# Patient Record
Sex: FEMALE | Race: WHITE | NOT HISPANIC OR LATINO | Employment: OTHER | URBAN - METROPOLITAN AREA
[De-identification: names, ages, dates, MRNs, and addresses within clinical notes are randomized per-mention and may not be internally consistent; named-entity substitution may affect disease eponyms.]

---

## 2024-11-05 ENCOUNTER — HOSPITAL ENCOUNTER (EMERGENCY)
Facility: HOSPITAL | Age: 89
Discharge: HOME | End: 2024-11-05
Payer: MEDICARE

## 2024-11-05 VITALS
DIASTOLIC BLOOD PRESSURE: 79 MMHG | BODY MASS INDEX: 34.73 KG/M2 | WEIGHT: 196 LBS | HEART RATE: 69 BPM | RESPIRATION RATE: 18 BRPM | SYSTOLIC BLOOD PRESSURE: 174 MMHG | HEIGHT: 63 IN | OXYGEN SATURATION: 96 % | TEMPERATURE: 97.7 F

## 2024-11-05 DIAGNOSIS — N39.0 URINARY TRACT INFECTION WITHOUT HEMATURIA, SITE UNSPECIFIED: ICD-10-CM

## 2024-11-05 DIAGNOSIS — H61.22 IMPACTED CERUMEN OF LEFT EAR: Primary | ICD-10-CM

## 2024-11-05 LAB
APPEARANCE UR: ABNORMAL
BACTERIA #/AREA URNS AUTO: ABNORMAL /HPF
BILIRUB UR STRIP.AUTO-MCNC: ABNORMAL MG/DL
COLOR UR: ABNORMAL
GLUCOSE UR STRIP.AUTO-MCNC: NORMAL MG/DL
HOLD SPECIMEN: NORMAL
KETONES UR STRIP.AUTO-MCNC: NEGATIVE MG/DL
LEUKOCYTE ESTERASE UR QL STRIP.AUTO: ABNORMAL
NITRITE UR QL STRIP.AUTO: ABNORMAL
PH UR STRIP.AUTO: 8 [PH]
PROT UR STRIP.AUTO-MCNC: ABNORMAL MG/DL
RBC # UR STRIP.AUTO: NEGATIVE /UL
RBC #/AREA URNS AUTO: ABNORMAL /HPF
SP GR UR STRIP.AUTO: 1.01
SQUAMOUS #/AREA URNS AUTO: ABNORMAL /HPF
UROBILINOGEN UR STRIP.AUTO-MCNC: ABNORMAL MG/DL
WBC #/AREA URNS AUTO: >50 /HPF

## 2024-11-05 PROCEDURE — 99283 EMERGENCY DEPT VISIT LOW MDM: CPT

## 2024-11-05 PROCEDURE — 87086 URINE CULTURE/COLONY COUNT: CPT | Mod: ELYLAB | Performed by: PHYSICIAN ASSISTANT

## 2024-11-05 PROCEDURE — 81001 URINALYSIS AUTO W/SCOPE: CPT | Performed by: PHYSICIAN ASSISTANT

## 2024-11-05 PROCEDURE — 2500000001 HC RX 250 WO HCPCS SELF ADMINISTERED DRUGS (ALT 637 FOR MEDICARE OP): Performed by: PHYSICIAN ASSISTANT

## 2024-11-05 PROCEDURE — 69209 REMOVE IMPACTED EAR WAX UNI: CPT | Mod: LT

## 2024-11-05 RX ORDER — CEPHALEXIN 500 MG/1
500 CAPSULE ORAL 2 TIMES DAILY
Qty: 14 CAPSULE | Refills: 0 | Status: SHIPPED | OUTPATIENT
Start: 2024-11-05 | End: 2024-11-12

## 2024-11-05 RX ORDER — CEPHALEXIN 500 MG/1
500 CAPSULE ORAL ONCE
Status: COMPLETED | OUTPATIENT
Start: 2024-11-05 | End: 2024-11-05

## 2024-11-05 ASSESSMENT — PAIN SCALES - GENERAL
PAINLEVEL_OUTOF10: 9
PAINLEVEL_OUTOF10: 0 - NO PAIN

## 2024-11-05 ASSESSMENT — COLUMBIA-SUICIDE SEVERITY RATING SCALE - C-SSRS
6. HAVE YOU EVER DONE ANYTHING, STARTED TO DO ANYTHING, OR PREPARED TO DO ANYTHING TO END YOUR LIFE?: NO
1. IN THE PAST MONTH, HAVE YOU WISHED YOU WERE DEAD OR WISHED YOU COULD GO TO SLEEP AND NOT WAKE UP?: NO
2. HAVE YOU ACTUALLY HAD ANY THOUGHTS OF KILLING YOURSELF?: NO

## 2024-11-05 ASSESSMENT — PAIN - FUNCTIONAL ASSESSMENT
PAIN_FUNCTIONAL_ASSESSMENT: 0-10
PAIN_FUNCTIONAL_ASSESSMENT: 0-10

## 2024-11-05 ASSESSMENT — LIFESTYLE VARIABLES
EVER HAD A DRINK FIRST THING IN THE MORNING TO STEADY YOUR NERVES TO GET RID OF A HANGOVER: NO
EVER FELT BAD OR GUILTY ABOUT YOUR DRINKING: NO
HAVE YOU EVER FELT YOU SHOULD CUT DOWN ON YOUR DRINKING: NO
TOTAL SCORE: 0
HAVE PEOPLE ANNOYED YOU BY CRITICIZING YOUR DRINKING: NO

## 2024-11-05 NOTE — ED PROVIDER NOTES
"HPI   Chief Complaint   Patient presents with    Cerumen Impaction     \"My ears is impacted with wax and I cannot get to the doctor until 12/2.\"    UTI symptoms for awhile       A 89-year-old female patient comes into the emergency department today with complaints of left-sided ear discomfort and decreased hearing.  She believes that she may have an impacted ear.  She also states she has some urinary incontinence is concerned about possible UTI.  For this purpose she comes into the emergency department today for further evaluation.  Otherwise no other complaints this present time.              Patient History   Past Medical History:   Diagnosis Date    Arcus senilis, unspecified eye 04/10/2018    Arcus senilis    Dry eye syndrome of unspecified lacrimal gland 04/10/2018    Dry eye syndrome    Personal history of other diseases of the musculoskeletal system and connective tissue     History of arthritis    Personal history of other endocrine, nutritional and metabolic disease     History of thyroid disease    Presence of intraocular lens 04/10/2018    Pseudophakia    Pure hypercholesterolemia, unspecified     High cholesterol     Past Surgical History:   Procedure Laterality Date    CATARACT EXTRACTION  02/23/2017    Cataract Extraction    OTHER SURGICAL HISTORY  02/23/2017    Iridotomy By YAG Laser     No family history on file.  Social History     Tobacco Use    Smoking status: Never    Smokeless tobacco: Never   Vaping Use    Vaping status: Never Used   Substance Use Topics    Alcohol use: Never    Drug use: Never       Physical Exam   ED Triage Vitals [11/05/24 1155]   Temperature Heart Rate Respirations BP   36.5 °C (97.7 °F) 67 18 (!) 180/106      Pulse Ox Temp Source Heart Rate Source Patient Position   96 % Temporal Monitor Sitting      BP Location FiO2 (%)     Right arm --       Physical Exam  Constitutional:       General: She is not in acute distress.     Appearance: Normal appearance. She is not " ill-appearing or diaphoretic.   HENT:      Head: Normocephalic and atraumatic.      Right Ear: Tympanic membrane normal.      Left Ear: There is impacted cerumen.      Nose: Nose normal.   Eyes:      Extraocular Movements: Extraocular movements intact.      Conjunctiva/sclera: Conjunctivae normal.      Pupils: Pupils are equal, round, and reactive to light.   Cardiovascular:      Rate and Rhythm: Normal rate and regular rhythm.   Pulmonary:      Effort: Pulmonary effort is normal. No respiratory distress.      Breath sounds: Normal breath sounds. No stridor. No wheezing.   Abdominal:      Tenderness: There is no right CVA tenderness or left CVA tenderness.   Musculoskeletal:         General: Normal range of motion.      Cervical back: Normal range of motion.   Skin:     General: Skin is warm and dry.   Neurological:      General: No focal deficit present.      Mental Status: She is alert and oriented to person, place, and time. Mental status is at baseline.   Psychiatric:         Mood and Affect: Mood normal.           ED Course & MDM   Diagnoses as of 11/05/24 1417   Impacted cerumen of left ear   Urinary tract infection without hematuria, site unspecified                 No data recorded                                 Medical Decision Making  A 89-year-old female patient comes into the emergency department today with complaints of left-sided ear discomfort and decreased hearing.  She believes that she may have an impacted ear.  She also states she has some urinary incontinence is concerned about possible UTI.  For this purpose she comes into the emergency department today for further evaluation.  Otherwise no other complaints this present time.    On clinical exam patient has a left-sided cerumen impaction.  Urinalysis ordered to rule out UTI.    Patient had successful ear irrigation no longer impacted cerumen on the left.  Will discharge patient home with p.o. Keflex for her UTI.  Patient agrees with this plan  expressed verbal understanding.  All questions were answered.    Historians patient    Diagnosis: Left cerumen impaction, urinary tract infection      Labs Reviewed   URINALYSIS WITH REFLEX CULTURE AND MICROSCOPIC - Abnormal       Result Value    Color, Urine Dark-Yellow      Appearance, Urine Turbid (*)     Specific Gravity, Urine 1.013      pH, Urine 8.0      Protein, Urine 10 (TRACE)      Glucose, Urine Normal      Blood, Urine NEGATIVE      Ketones, Urine NEGATIVE      Bilirubin, Urine 1 (1+) (*)     Urobilinogen, Urine 3 (1+) (*)     Nitrite, Urine 2+ (*)     Leukocyte Esterase, Urine 500 [place]/µL (*)    MICROSCOPIC ONLY, URINE - Abnormal    WBC, Urine >50 (*)     RBC, Urine NONE      Squamous Epithelial Cells, Urine 1-9 (SPARSE)      Bacteria, Urine 1+ (*)    URINE CULTURE   URINALYSIS WITH REFLEX CULTURE AND MICROSCOPIC    Narrative:     The following orders were created for panel order Urinalysis with Reflex Culture and Microscopic.  Procedure                               Abnormality         Status                     ---------                               -----------         ------                     Urinalysis with Reflex C...[[id number]]  Abnormal            Final result               Extra Urine Gray Tube[[id number]]                            In process                   Please view results for these tests on the individual orders.   EXTRA URINE GRAY TUBE        No orders to display         Procedure  Procedures     Dionisio Viera PA-C  11/05/24 3358

## 2024-11-06 LAB — BACTERIA UR CULT: ABNORMAL

## 2024-11-07 ENCOUNTER — TELEPHONE (OUTPATIENT)
Dept: PHARMACY | Facility: HOSPITAL | Age: 89
End: 2024-11-07
Payer: MEDICARE

## 2024-11-07 NOTE — TELEPHONE ENCOUNTER
EDPD Note: Rapid Result Review    I reviewed Ursula Barajas 's chart regarding a positive urine culture/result that was taken during their recent emergency room visit. The patient was not told about these results prior to leaving the emergency department. Therefore, patient was contacted and given proper education.     No results found for the last 90 days.    Patient is a 89yo female who presented to ED with urinary incontinence, discharged with Keflex 500mg 1 capsule PO BID for 7 days.     Patient was called and is still incontinent, confirmed no pain while urinating or flank pain. Patient took OTC medication for UTI, phenazopyridine, which turned urine red, patient was concerned about bladder cancer.     Patient cultures were present due to method of urine collection, needs urine collection hat and is unable to repeat due to transportation issues. Recommended continuation of antibiotics and counseled on possible side effects of nausea and diarrhea, take with food to reduce adverse side effects.        No further follow up needed from EDPD Team.     If there are any other questions for the ED Post-Discharge Culture Follow Up Team, please contact [phone]. Fax: [phone].    Vanesa Person, PharmD

## 2024-11-07 NOTE — PROGRESS NOTES
EDPD Note: Rapid Result Review    I reviewed Ursula Barajas 's chart regarding a positive urine culture/result that was taken during their recent emergency room visit. The patient was not told about these results prior to leaving the emergency department. Therefore, patient was contacted and given proper education.     No results found for the last 90 days.    Patient is a 89yo female who presented to ED with urinary incontinence, discharged with Keflex 500mg 1 capsule PO BID for 7 days.     Patient was called and is still incontinent, confirmed no pain while urinating or flank pain. Patient took OTC medication for UTI, phenazopyridine, which turned urine red, patient was concerned about bladder cancer.     Patient cultures were present due to method of urine collection, needs urine collection hat and is unable to repeat due to transportation issues. Recommended continuation of antibiotics and counseled on possible side effects of nausea and diarrhea, take with food to reduce adverse side effects.        No further follow up needed from EDPD Team.     If there are any other questions for the ED Post-Discharge Culture Follow Up Team, please contact [phone]. Fax: [phone].    Vanesa Person, PharmD

## 2024-11-13 ENCOUNTER — APPOINTMENT (OUTPATIENT)
Dept: CARDIOLOGY | Facility: HOSPITAL | Age: 89
End: 2024-11-13
Payer: MEDICARE

## 2024-11-13 ENCOUNTER — HOSPITAL ENCOUNTER (EMERGENCY)
Facility: HOSPITAL | Age: 89
Discharge: HOME | End: 2024-11-13
Payer: MEDICARE

## 2024-11-13 ENCOUNTER — APPOINTMENT (OUTPATIENT)
Dept: RADIOLOGY | Facility: HOSPITAL | Age: 89
End: 2024-11-13
Payer: MEDICARE

## 2024-11-13 VITALS
DIASTOLIC BLOOD PRESSURE: 73 MMHG | SYSTOLIC BLOOD PRESSURE: 156 MMHG | BODY MASS INDEX: 34.55 KG/M2 | TEMPERATURE: 98.2 F | RESPIRATION RATE: 18 BRPM | WEIGHT: 195 LBS | HEIGHT: 63 IN | HEART RATE: 58 BPM | OXYGEN SATURATION: 98 %

## 2024-11-13 DIAGNOSIS — R51.9 NONINTRACTABLE HEADACHE, UNSPECIFIED CHRONICITY PATTERN, UNSPECIFIED HEADACHE TYPE: Primary | ICD-10-CM

## 2024-11-13 LAB
ALBUMIN SERPL BCP-MCNC: 4.2 G/DL (ref 3.4–5)
ALP SERPL-CCNC: 52 U/L (ref 33–136)
ALT SERPL W P-5'-P-CCNC: 12 U/L (ref 7–45)
ANION GAP SERPL CALC-SCNC: 15 MMOL/L (ref 10–20)
APPEARANCE UR: CLEAR
AST SERPL W P-5'-P-CCNC: 15 U/L (ref 9–39)
BASOPHILS # BLD AUTO: 0.05 X10*3/UL (ref 0–0.1)
BASOPHILS NFR BLD AUTO: 0.8 %
BILIRUB SERPL-MCNC: 0.6 MG/DL (ref 0–1.2)
BILIRUB UR STRIP.AUTO-MCNC: NEGATIVE MG/DL
BNP SERPL-MCNC: 48 PG/ML (ref 0–99)
BUN SERPL-MCNC: 23 MG/DL (ref 6–23)
CALCIUM SERPL-MCNC: 9.2 MG/DL (ref 8.6–10.3)
CARDIAC TROPONIN I PNL SERPL HS: 10 NG/L (ref 0–13)
CARDIAC TROPONIN I PNL SERPL HS: 9 NG/L (ref 0–13)
CHLORIDE SERPL-SCNC: 101 MMOL/L (ref 98–107)
CO2 SERPL-SCNC: 28 MMOL/L (ref 21–32)
COLOR UR: ABNORMAL
CREAT SERPL-MCNC: 1.29 MG/DL (ref 0.5–1.05)
EGFRCR SERPLBLD CKD-EPI 2021: 40 ML/MIN/1.73M*2
EOSINOPHIL # BLD AUTO: 0.09 X10*3/UL (ref 0–0.4)
EOSINOPHIL NFR BLD AUTO: 1.4 %
ERYTHROCYTE [DISTWIDTH] IN BLOOD BY AUTOMATED COUNT: 13.2 % (ref 11.5–14.5)
ERYTHROCYTE [SEDIMENTATION RATE] IN BLOOD BY WESTERGREN METHOD: 35 MM/H (ref 0–30)
GLUCOSE SERPL-MCNC: 129 MG/DL (ref 74–99)
GLUCOSE UR STRIP.AUTO-MCNC: NORMAL MG/DL
HCT VFR BLD AUTO: 38.4 % (ref 36–46)
HGB BLD-MCNC: 12.6 G/DL (ref 12–16)
HOLD SPECIMEN: NORMAL
IMM GRANULOCYTES # BLD AUTO: 0.02 X10*3/UL (ref 0–0.5)
IMM GRANULOCYTES NFR BLD AUTO: 0.3 % (ref 0–0.9)
INR PPP: 1.1 (ref 0.9–1.1)
KETONES UR STRIP.AUTO-MCNC: NEGATIVE MG/DL
LACTATE SERPL-SCNC: 1.8 MMOL/L (ref 0.4–2)
LEUKOCYTE ESTERASE UR QL STRIP.AUTO: ABNORMAL
LYMPHOCYTES # BLD AUTO: 2.01 X10*3/UL (ref 0.8–3)
LYMPHOCYTES NFR BLD AUTO: 30.9 %
MAGNESIUM SERPL-MCNC: 1.87 MG/DL (ref 1.6–2.4)
MCH RBC QN AUTO: 28.5 PG (ref 26–34)
MCHC RBC AUTO-ENTMCNC: 32.8 G/DL (ref 32–36)
MCV RBC AUTO: 87 FL (ref 80–100)
MONOCYTES # BLD AUTO: 0.5 X10*3/UL (ref 0.05–0.8)
MONOCYTES NFR BLD AUTO: 7.7 %
MUCOUS THREADS #/AREA URNS AUTO: ABNORMAL /LPF
NEUTROPHILS # BLD AUTO: 3.83 X10*3/UL (ref 1.6–5.5)
NEUTROPHILS NFR BLD AUTO: 58.9 %
NITRITE UR QL STRIP.AUTO: NEGATIVE
NRBC BLD-RTO: 0 /100 WBCS (ref 0–0)
PH UR STRIP.AUTO: 5.5 [PH]
PLATELET # BLD AUTO: 308 X10*3/UL (ref 150–450)
POTASSIUM SERPL-SCNC: 3.8 MMOL/L (ref 3.5–5.3)
PROT SERPL-MCNC: 7 G/DL (ref 6.4–8.2)
PROT UR STRIP.AUTO-MCNC: NEGATIVE MG/DL
PROTHROMBIN TIME: 12.6 SECONDS (ref 9.8–12.8)
RBC # BLD AUTO: 4.42 X10*6/UL (ref 4–5.2)
RBC # UR STRIP.AUTO: NEGATIVE /UL
RBC #/AREA URNS AUTO: ABNORMAL /HPF
SODIUM SERPL-SCNC: 140 MMOL/L (ref 136–145)
SP GR UR STRIP.AUTO: 1.03
UROBILINOGEN UR STRIP.AUTO-MCNC: NORMAL MG/DL
WBC # BLD AUTO: 6.5 X10*3/UL (ref 4.4–11.3)
WBC #/AREA URNS AUTO: >50 /HPF
WBC CLUMPS #/AREA URNS AUTO: ABNORMAL /HPF

## 2024-11-13 PROCEDURE — 70498 CT ANGIOGRAPHY NECK: CPT | Performed by: RADIOLOGY

## 2024-11-13 PROCEDURE — 2550000001 HC RX 255 CONTRASTS: Performed by: PHYSICIAN ASSISTANT

## 2024-11-13 PROCEDURE — 36415 COLL VENOUS BLD VENIPUNCTURE: CPT | Performed by: PHYSICIAN ASSISTANT

## 2024-11-13 PROCEDURE — 99285 EMERGENCY DEPT VISIT HI MDM: CPT | Mod: 25

## 2024-11-13 PROCEDURE — 70450 CT HEAD/BRAIN W/O DYE: CPT

## 2024-11-13 PROCEDURE — 96365 THER/PROPH/DIAG IV INF INIT: CPT | Mod: 59

## 2024-11-13 PROCEDURE — 71045 X-RAY EXAM CHEST 1 VIEW: CPT

## 2024-11-13 PROCEDURE — 2500000004 HC RX 250 GENERAL PHARMACY W/ HCPCS (ALT 636 FOR OP/ED): Performed by: PHYSICIAN ASSISTANT

## 2024-11-13 PROCEDURE — 85025 COMPLETE CBC W/AUTO DIFF WBC: CPT | Performed by: PHYSICIAN ASSISTANT

## 2024-11-13 PROCEDURE — 80053 COMPREHEN METABOLIC PANEL: CPT | Performed by: PHYSICIAN ASSISTANT

## 2024-11-13 PROCEDURE — 93005 ELECTROCARDIOGRAM TRACING: CPT

## 2024-11-13 PROCEDURE — 85610 PROTHROMBIN TIME: CPT | Performed by: PHYSICIAN ASSISTANT

## 2024-11-13 PROCEDURE — 81001 URINALYSIS AUTO W/SCOPE: CPT | Performed by: PHYSICIAN ASSISTANT

## 2024-11-13 PROCEDURE — 96375 TX/PRO/DX INJ NEW DRUG ADDON: CPT | Mod: 59

## 2024-11-13 PROCEDURE — 85652 RBC SED RATE AUTOMATED: CPT | Performed by: PHYSICIAN ASSISTANT

## 2024-11-13 PROCEDURE — 70450 CT HEAD/BRAIN W/O DYE: CPT | Performed by: RADIOLOGY

## 2024-11-13 PROCEDURE — 84484 ASSAY OF TROPONIN QUANT: CPT | Performed by: PHYSICIAN ASSISTANT

## 2024-11-13 PROCEDURE — 96366 THER/PROPH/DIAG IV INF ADDON: CPT

## 2024-11-13 PROCEDURE — 83880 ASSAY OF NATRIURETIC PEPTIDE: CPT | Performed by: PHYSICIAN ASSISTANT

## 2024-11-13 PROCEDURE — 70498 CT ANGIOGRAPHY NECK: CPT

## 2024-11-13 PROCEDURE — 83735 ASSAY OF MAGNESIUM: CPT | Performed by: PHYSICIAN ASSISTANT

## 2024-11-13 PROCEDURE — 87086 URINE CULTURE/COLONY COUNT: CPT | Mod: ELYLAB | Performed by: PHYSICIAN ASSISTANT

## 2024-11-13 PROCEDURE — 83605 ASSAY OF LACTIC ACID: CPT | Performed by: PHYSICIAN ASSISTANT

## 2024-11-13 PROCEDURE — 71045 X-RAY EXAM CHEST 1 VIEW: CPT | Performed by: RADIOLOGY

## 2024-11-13 RX ORDER — METOCLOPRAMIDE HYDROCHLORIDE 5 MG/ML
10 INJECTION INTRAMUSCULAR; INTRAVENOUS ONCE
Status: COMPLETED | OUTPATIENT
Start: 2024-11-13 | End: 2024-11-13

## 2024-11-13 RX ORDER — MAGNESIUM SULFATE 1 G/100ML
1 INJECTION INTRAVENOUS ONCE
Status: COMPLETED | OUTPATIENT
Start: 2024-11-13 | End: 2024-11-13

## 2024-11-13 RX ORDER — DIPHENHYDRAMINE HYDROCHLORIDE 50 MG/ML
25 INJECTION INTRAMUSCULAR; INTRAVENOUS ONCE
Status: COMPLETED | OUTPATIENT
Start: 2024-11-13 | End: 2024-11-13

## 2024-11-13 RX ORDER — ACETAMINOPHEN 500 MG
1000 TABLET ORAL EVERY 6 HOURS PRN
Qty: 30 TABLET | Refills: 0 | Status: SHIPPED | OUTPATIENT
Start: 2024-11-13 | End: 2024-11-23

## 2024-11-13 ASSESSMENT — LIFESTYLE VARIABLES
HAVE PEOPLE ANNOYED YOU BY CRITICIZING YOUR DRINKING: NO
EVER HAD A DRINK FIRST THING IN THE MORNING TO STEADY YOUR NERVES TO GET RID OF A HANGOVER: NO
HAVE YOU EVER FELT YOU SHOULD CUT DOWN ON YOUR DRINKING: NO
TOTAL SCORE: 0
EVER FELT BAD OR GUILTY ABOUT YOUR DRINKING: NO

## 2024-11-13 ASSESSMENT — PAIN DESCRIPTION - LOCATION: LOCATION: HEAD

## 2024-11-13 ASSESSMENT — PAIN - FUNCTIONAL ASSESSMENT: PAIN_FUNCTIONAL_ASSESSMENT: 0-10

## 2024-11-13 ASSESSMENT — COLUMBIA-SUICIDE SEVERITY RATING SCALE - C-SSRS
6. HAVE YOU EVER DONE ANYTHING, STARTED TO DO ANYTHING, OR PREPARED TO DO ANYTHING TO END YOUR LIFE?: NO
2. HAVE YOU ACTUALLY HAD ANY THOUGHTS OF KILLING YOURSELF?: NO
1. IN THE PAST MONTH, HAVE YOU WISHED YOU WERE DEAD OR WISHED YOU COULD GO TO SLEEP AND NOT WAKE UP?: NO

## 2024-11-13 ASSESSMENT — PAIN DESCRIPTION - DESCRIPTORS: DESCRIPTORS: NAGGING;ACHING

## 2024-11-13 ASSESSMENT — PAIN DESCRIPTION - PAIN TYPE: TYPE: ACUTE PAIN

## 2024-11-13 ASSESSMENT — PAIN SCALES - GENERAL: PAINLEVEL_OUTOF10: 7

## 2024-11-13 NOTE — ED PROVIDER NOTES
HPI   Chief Complaint   Patient presents with    Headache     X 2 weeks       89-year-old female patient comes into the emergency department today with complaints of a headache for the last 3 weeks.  Describes it as constant.  States on top of her head and radiates down the left side of her head and neck.  States she has associated nausea without vomiting.  Denies any fevers, chills.  States she feels like when she moves her neck it gets worse.  She rates pain a 7 out of 10 on the pain scale.  For this purpose she comes into the emergency department today further evaluation.  Otherwise no other complaints present time.  States she did finish antibiotics for treatment of a urinary tract infection yesterday.  She describes the pain as sharp.              Patient History   Past Medical History:   Diagnosis Date    Arcus senilis, unspecified eye 04/10/2018    Arcus senilis    Dry eye syndrome of unspecified lacrimal gland 04/10/2018    Dry eye syndrome    Personal history of other diseases of the musculoskeletal system and connective tissue     History of arthritis    Personal history of other endocrine, nutritional and metabolic disease     History of thyroid disease    Presence of intraocular lens 04/10/2018    Pseudophakia    Pure hypercholesterolemia, unspecified     High cholesterol     Past Surgical History:   Procedure Laterality Date    CATARACT EXTRACTION  02/23/2017    Cataract Extraction    OTHER SURGICAL HISTORY  02/23/2017    Iridotomy By YAG Laser     No family history on file.  Social History     Tobacco Use    Smoking status: Never    Smokeless tobacco: Never   Vaping Use    Vaping status: Never Used   Substance Use Topics    Alcohol use: Never    Drug use: Never       Physical Exam   ED Triage Vitals [11/13/24 0609]   Temperature Heart Rate Respirations BP   36.8 °C (98.2 °F) 58 18 (!) 188/73      Pulse Ox Temp Source Heart Rate Source Patient Position   98 % Temporal Monitor --      BP Location FiO2  (%)     -- --       Physical Exam  Constitutional:       General: She is in acute distress.      Appearance: Normal appearance. She is well-developed. She is not ill-appearing or diaphoretic.   HENT:      Head: Normocephalic and atraumatic.      Nose: Nose normal.   Eyes:      Extraocular Movements: Extraocular movements intact.      Conjunctiva/sclera: Conjunctivae normal.      Pupils: Pupils are equal, round, and reactive to light.   Cardiovascular:      Rate and Rhythm: Normal rate and regular rhythm.   Pulmonary:      Effort: Pulmonary effort is normal. No respiratory distress.      Breath sounds: Normal breath sounds. No stridor. No wheezing.   Musculoskeletal:         General: Normal range of motion.      Cervical back: Normal range of motion.   Skin:     General: Skin is warm and dry.   Neurological:      General: No focal deficit present.      Mental Status: She is alert and oriented to person, place, and time. Mental status is at baseline.      GCS: GCS eye subscore is 4. GCS verbal subscore is 5. GCS motor subscore is 6.   Psychiatric:         Mood and Affect: Mood normal.           ED Course & MDM   Diagnoses as of 11/13/24 1131   Nonintractable headache, unspecified chronicity pattern, unspecified headache type                 No data recorded     [place] Coma Scale Score: 15 (11/13/24 0944 : Jodi Knight RN)                           Medical Decision Making  89-year-old female patient comes into the emergency department today with complaints of a headache for the last 3 weeks.  Describes it as constant.  States on top of her head and radiates down the left side of her head and neck.  States she has associated nausea without vomiting.  Denies any fevers, chills.  States she feels like when she moves her neck it gets worse.  She rates pain a 7 out of 10 on the pain scale.  For this purpose she comes into the emergency department today further evaluation.  Otherwise no other complaints present time.   States she did finish antibiotics for treatment of a urinary tract infection yesterday.  She describes the pain as sharp.    EKG, chest x-ray, laboratory studies ordered as well as CT study of the head CTA neck.  Rule out ACS, arrhythmia, electrolyte abnormality, leukocytosis, acute kidney injury, intracranial bleed, edema, mass.  Vascular compromise of the neck.  IV Benadryl IV Reglan IV and Decadron IV magnesium ordered for patient's headache.  Urinalysis ordered to rule out UTI.    EKG: My EKG interpretation, sinus bradycardia, 58 bpm, no apparent ST elevations.  Poor tracing    EKG: My EKG interpretation, 59 bpm, sinus bradycardia, no ST elevations, no STEMI    Patient's troponin negative urinalysis has leukocytes white cells but no bacteria.  Will at this go to culture.  Lactate negative, creatinine 1.2 GFR 40, magnesium normal, BMP negative, no leukocytosis or left shift, CT angio negative as well as CT head and chest x-ray.  Patient is feeling much better after medication.  Will discharge patient home with follow-up neurology to evaluate for frequent headaches.  Patient agrees with this plan expressed verbal understanding.  All questions were answered.    Historian is the patient    Diagnosis: Cephalgia          Labs Reviewed   COMPREHENSIVE METABOLIC PANEL - Abnormal       Result Value    Glucose 129 (*)     Sodium 140      Potassium 3.8      Chloride 101      Bicarbonate 28      Anion Gap 15      Urea Nitrogen 23      Creatinine 1.29 (*)     eGFR 40 (*)     Calcium 9.2      Albumin 4.2      Alkaline Phosphatase 52      Total Protein 7.0      AST 15      Bilirubin, Total 0.6      ALT 12     SEDIMENTATION RATE, AUTOMATED - Abnormal    Sedimentation Rate 35 (*)    URINALYSIS WITH REFLEX CULTURE AND MICROSCOPIC - Abnormal    Color, Urine Light-Yellow      Appearance, Urine Clear      Specific Gravity, Urine 1.027      pH, Urine 5.5      Protein, Urine NEGATIVE      Glucose, Urine Normal      Blood, Urine  NEGATIVE      Ketones, Urine NEGATIVE      Bilirubin, Urine NEGATIVE      Urobilinogen, Urine Normal      Nitrite, Urine NEGATIVE      Leukocyte Esterase, Urine 500 Sakina/µL (*)    MICROSCOPIC ONLY, URINE - Abnormal    WBC, Urine >50 (*)     WBC Clumps, Urine OCCASIONAL      RBC, Urine 1-2      Mucus, Urine FEW     MAGNESIUM - Normal    Magnesium 1.87     LACTATE - Normal    Lactate 1.8      Narrative:     Venipuncture immediately after or during the administration of Metamizole may lead to falsely low results. Testing should be performed immediately prior to Metamizole dosing.   PROTIME-INR - Normal    Protime 12.6      INR 1.1     B-TYPE NATRIURETIC PEPTIDE - Normal    BNP 48      Narrative:        <100 pg/mL - Heart failure unlikely  100-299 pg/mL - Intermediate probability of acute heart                  failure exacerbation. Correlate with clinical                  context and patient history.    >=300 pg/mL - Heart Failure likely. Correlate with clinical                  context and patient history.    BNP testing is performed using different testing methodology at [facility] than at other [facility]. Direct result comparisons should only be made within the same method.      SERIAL TROPONIN-INITIAL - Normal    Troponin I, High Sensitivity 9      Narrative:     Less than 99th percentile of normal range cutoff-  Female and children under 18 years old <14 ng/L; Male <21 ng/L: Negative  Repeat testing should be performed if clinically indicated.     Female and children under 18 years old 14-50 ng/L; Male 21-50 ng/L:  Consistent with possible cardiac damage and possible increased clinical   risk. Serial measurements may help to assess extent of myocardial damage.     >50 ng/L: Consistent with cardiac damage, increased clinical risk and  myocardial infarction. Serial measurements may help assess extent of   myocardial damage.      NOTE: Children less than 1 year old may have higher baseline  troponin   levels and results should be interpreted in conjunction with the overall   clinical context.     NOTE: Troponin I testing is performed using a different   testing methodology at [facility] than at other   [facility]. Direct result comparisons should only   be made within the same method.   SERIAL TROPONIN, 1 HOUR - Normal    Troponin I, High Sensitivity 10      Narrative:     Less than 99th percentile of normal range cutoff-  Female and children under 18 years old <14 ng/L; Male <21 ng/L: Negative  Repeat testing should be performed if clinically indicated.     Female and children under 18 years old 14-50 ng/L; Male 21-50 ng/L:  Consistent with possible cardiac damage and possible increased clinical   risk. Serial measurements may help to assess extent of myocardial damage.     >50 ng/L: Consistent with cardiac damage, increased clinical risk and  myocardial infarction. Serial measurements may help assess extent of   myocardial damage.      NOTE: Children less than 1 year old may have higher baseline troponin   levels and results should be interpreted in conjunction with the overall   clinical context.     NOTE: Troponin I testing is performed using a different   testing methodology at [facility] than at other   [facility]. Direct result comparisons should only   be made within the same method.   URINE CULTURE   TROPONIN SERIES- (INITIAL, 1 HR)    Narrative:     The following orders were created for panel order Troponin I Series, High Sensitivity (0, 1 HR).  Procedure                               Abnormality         Status                     ---------                               -----------         ------                     Troponin I, High Sensiti...[[id number]]  Normal              Final result               Troponin, High Sensitivi...[[id number]]  Normal              Final result                 Please view results for these tests on the individual orders.    CBC WITH AUTO DIFFERENTIAL    WBC 6.5      nRBC 0.0      RBC 4.42      Hemoglobin 12.6      Hematocrit 38.4      MCV 87      MCH 28.5      MCHC 32.8      RDW 13.2      Platelets 308      Neutrophils % 58.9      Immature Granulocytes %, Automated 0.3      Lymphocytes % 30.9      Monocytes % 7.7      Eosinophils % 1.4      Basophils % 0.8      Neutrophils Absolute 3.83      Immature Granulocytes Absolute, Automated 0.02      Lymphocytes Absolute 2.01      Monocytes Absolute 0.50      Eosinophils Absolute 0.09      Basophils Absolute 0.05     URINALYSIS WITH REFLEX CULTURE AND MICROSCOPIC    Narrative:     The following orders were created for panel order Urinalysis with Reflex Culture and Microscopic.  Procedure                               Abnormality         Status                     ---------                               -----------         ------                     Urinalysis with Reflex C...[[id number]]  Abnormal            Final result               Extra Urine Gray Tube[[id number]]                            In process                   Please view results for these tests on the individual orders.   EXTRA URINE GRAY TUBE        CT angio neck   Final Result   * CT angiography of the carotid and vertebral circulation is within   normal limits *Bony productive and degenerative change especially at   the right-sided facet joints at C3/C4 *No evidence of dissection        MACRO:   none        Signed by: Benjamín Johnson 11/13/2024 8:30 AM   Dictation workstation:   [id number]      CT head wo IV contrast   Final Result   No acute intracranial hemorrhage or mass-effect.        MACRO:   None.        Signed by: Jossie Pang 11/13/2024 8:26 AM   Dictation workstation:   [id number]      XR chest 1 view   Final Result   1.  Mild left basilar atelectasis or scarring.                  MACRO:   None.        Signed by: Michael Washington 11/13/2024 7:34 AM   Dictation workstation:   [id number]             Procedure  Procedures     Dionisio Viera PA-C  11/13/24 1133

## 2024-11-15 LAB
ATRIAL RATE: 58 BPM
ATRIAL RATE: 59 BPM
BACTERIA UR CULT: ABNORMAL
P AXIS: 50 DEGREES
P AXIS: 59 DEGREES
P OFFSET: 114 MS
P OFFSET: 123 MS
P ONSET: 91 MS
P ONSET: 91 MS
PR INTERVAL: 250 MS
PR INTERVAL: 256 MS
Q ONSET: 216 MS
Q ONSET: 219 MS
QRS COUNT: 10 BEATS
QRS COUNT: 9 BEATS
QRS DURATION: 88 MS
QRS DURATION: 94 MS
QT INTERVAL: 402 MS
QT INTERVAL: 416 MS
QTC CALCULATION(BAZETT): 397 MS
QTC CALCULATION(BAZETT): 408 MS
QTC FREDERICIA: 399 MS
QTC FREDERICIA: 411 MS
R AXIS: -15 DEGREES
R AXIS: -7 DEGREES
T AXIS: 144 DEGREES
T AXIS: 55 DEGREES
T OFFSET: 417 MS
T OFFSET: 427 MS
VENTRICULAR RATE: 58 BPM
VENTRICULAR RATE: 59 BPM

## 2024-11-18 ENCOUNTER — TELEPHONE (OUTPATIENT)
Dept: PHARMACY | Facility: HOSPITAL | Age: 89
End: 2024-11-18
Payer: MEDICARE

## 2024-11-18 DIAGNOSIS — N30.00 ACUTE CYSTITIS WITHOUT HEMATURIA: Primary | ICD-10-CM

## 2024-11-18 RX ORDER — CIPROFLOXACIN 250 MG/1
250 TABLET, FILM COATED ORAL 2 TIMES DAILY
Qty: 6 TABLET | Refills: 0 | Status: SHIPPED | OUTPATIENT
Start: 2024-11-18 | End: 2024-11-21

## 2024-11-18 NOTE — PROGRESS NOTES
"EDPD Note: Bug-Drug Mismatch    Contacted Mr./Mrs./Ms. Ursula RUBY Barajas regarding a positive urine culture/result that was taken during their recent emergency room visit. I completed education with patient. The patient is not being treated appropriately with Keflex.     Pt presented to ED with headache and nausea without vomiting. Pt was treated for UTI from last ED visit on 24, which was Keflex 500 mg, which was inappropriate, since it does not cover Pseudomonas aeruginosa. Pt stated that she is incontinence, always experience urgency and the feeling of unable to empty her bladder. Pt stated she feel very \"uncomfortable down there\". Recommend ciprofloxacin 250 mg BID x 3 days. Educated pt on the importance of finishing the course of treatment and advised pt to follow up with PCP if symptoms persisted    The following prescription was sent to the patient's preferred pharmacy. No further follow up needed from EDPD Team.   Drug: ciprofloxacin 250 mg   Si tablet BID x 3 days  Days Supply: 3    Susceptibility data from last 90 days.  Collected Specimen Info Organism Aztreonam Cefepime Ceftazidime Ciprofloxacin Piperacillin/Tazobactam Tobramycin   24 Urine from Clean Catch/Voided Pseudomonas aeruginosa  S  S  S  S  S  S       Thu T Ruslan, RPh  "